# Patient Record
Sex: FEMALE | Race: WHITE | HISPANIC OR LATINO | Employment: FULL TIME | URBAN - METROPOLITAN AREA
[De-identification: names, ages, dates, MRNs, and addresses within clinical notes are randomized per-mention and may not be internally consistent; named-entity substitution may affect disease eponyms.]

---

## 2024-08-08 ENCOUNTER — APPOINTMENT (OUTPATIENT)
Dept: RADIOLOGY | Facility: HOSPITAL | Age: 39
End: 2024-08-08
Payer: COMMERCIAL

## 2024-08-08 ENCOUNTER — HOSPITAL ENCOUNTER (EMERGENCY)
Facility: HOSPITAL | Age: 39
Discharge: HOME | End: 2024-08-08
Attending: EMERGENCY MEDICINE
Payer: COMMERCIAL

## 2024-08-08 VITALS
WEIGHT: 215 LBS | HEIGHT: 69 IN | SYSTOLIC BLOOD PRESSURE: 139 MMHG | TEMPERATURE: 97.9 F | RESPIRATION RATE: 18 BRPM | DIASTOLIC BLOOD PRESSURE: 67 MMHG | HEART RATE: 98 BPM | BODY MASS INDEX: 31.84 KG/M2 | OXYGEN SATURATION: 98 %

## 2024-08-08 DIAGNOSIS — S41.121A: Primary | ICD-10-CM

## 2024-08-08 PROCEDURE — 90715 TDAP VACCINE 7 YRS/> IM: CPT

## 2024-08-08 PROCEDURE — 2500000004 HC RX 250 GENERAL PHARMACY W/ HCPCS (ALT 636 FOR OP/ED)

## 2024-08-08 PROCEDURE — 90471 IMMUNIZATION ADMIN: CPT

## 2024-08-08 PROCEDURE — 99283 EMERGENCY DEPT VISIT LOW MDM: CPT | Mod: 25

## 2024-08-08 PROCEDURE — 2500000005 HC RX 250 GENERAL PHARMACY W/O HCPCS

## 2024-08-08 PROCEDURE — 73090 X-RAY EXAM OF FOREARM: CPT | Mod: RIGHT SIDE | Performed by: RADIOLOGY

## 2024-08-08 PROCEDURE — 12002 RPR S/N/AX/GEN/TRNK2.6-7.5CM: CPT

## 2024-08-08 PROCEDURE — 73090 X-RAY EXAM OF FOREARM: CPT | Mod: RT

## 2024-08-08 PROCEDURE — 99284 EMERGENCY DEPT VISIT MOD MDM: CPT

## 2024-08-08 RX ORDER — LIDOCAINE HYDROCHLORIDE 10 MG/ML
10 INJECTION INFILTRATION; PERINEURAL ONCE
Status: DISCONTINUED | OUTPATIENT
Start: 2024-08-08 | End: 2024-08-08

## 2024-08-08 RX ORDER — CEPHALEXIN 500 MG/1
500 CAPSULE ORAL 4 TIMES DAILY
Qty: 28 CAPSULE | Refills: 0 | Status: SHIPPED | OUTPATIENT
Start: 2024-08-08 | End: 2024-08-15

## 2024-08-08 RX ORDER — LIDOCAINE HYDROCHLORIDE 10 MG/ML
10 INJECTION, SOLUTION EPIDURAL; INFILTRATION; INTRACAUDAL; PERINEURAL ONCE
Status: COMPLETED | OUTPATIENT
Start: 2024-08-08 | End: 2024-08-08

## 2024-08-08 RX ORDER — CEPHALEXIN 500 MG/1
500 CAPSULE ORAL 4 TIMES DAILY
Qty: 40 CAPSULE | Refills: 0 | Status: SHIPPED | OUTPATIENT
Start: 2024-08-08 | End: 2024-08-08

## 2024-08-08 RX ADMIN — LIDOCAINE HYDROCHLORIDE 100 MG: 10 INJECTION, SOLUTION EPIDURAL; INFILTRATION; INTRACAUDAL; PERINEURAL at 18:15

## 2024-08-08 RX ADMIN — TETANUS TOXOID, REDUCED DIPHTHERIA TOXOID AND ACELLULAR PERTUSSIS VACCINE, ADSORBED 0.5 ML: 5; 2.5; 8; 8; 2.5 SUSPENSION INTRAMUSCULAR at 16:54

## 2024-08-08 ASSESSMENT — PAIN - FUNCTIONAL ASSESSMENT: PAIN_FUNCTIONAL_ASSESSMENT: 0-10

## 2024-08-08 ASSESSMENT — LIFESTYLE VARIABLES
HAVE YOU EVER FELT YOU SHOULD CUT DOWN ON YOUR DRINKING: NO
EVER HAD A DRINK FIRST THING IN THE MORNING TO STEADY YOUR NERVES TO GET RID OF A HANGOVER: NO
EVER FELT BAD OR GUILTY ABOUT YOUR DRINKING: NO
TOTAL SCORE: 0
HAVE PEOPLE ANNOYED YOU BY CRITICIZING YOUR DRINKING: NO

## 2024-08-08 ASSESSMENT — PAIN SCALES - GENERAL: PAINLEVEL_OUTOF10: 7

## 2024-08-08 ASSESSMENT — COLUMBIA-SUICIDE SEVERITY RATING SCALE - C-SSRS
2. HAVE YOU ACTUALLY HAD ANY THOUGHTS OF KILLING YOURSELF?: NO
1. IN THE PAST MONTH, HAVE YOU WISHED YOU WERE DEAD OR WISHED YOU COULD GO TO SLEEP AND NOT WAKE UP?: NO
6. HAVE YOU EVER DONE ANYTHING, STARTED TO DO ANYTHING, OR PREPARED TO DO ANYTHING TO END YOUR LIFE?: NO

## 2024-08-08 ASSESSMENT — PAIN DESCRIPTION - PAIN TYPE: TYPE: ACUTE PAIN

## 2024-08-08 ASSESSMENT — PAIN DESCRIPTION - LOCATION: LOCATION: ARM

## 2024-08-08 ASSESSMENT — PAIN DESCRIPTION - ORIENTATION: ORIENTATION: RIGHT

## 2024-08-08 NOTE — DISCHARGE INSTRUCTIONS
Take antibiotics as prescribed.  Follow-up with referral.  Sutures will need to be removed in 10 to 14 days.

## 2024-08-08 NOTE — ED PROVIDER NOTES
Emergency Department Encounter  [facility]    Patient: James Ramos  MRN: [id number]  : 1985  Date of Evaluation: 2024  ED Provider: Gus Liu PA-C    ED care was supervised by Dr. Willett who independently examined and evaluated the patient. Please see their attestation note for further details.      Chief Complaint     Laceration    [place]    (Location/Symptom, Timing/Onset, Context/Setting, Quality, Duration, Modifying Factors, Severity) Note limiting factors.     James Ramos is a 39 y.o. female who presents to the emergency department for laceration to right forearm.  Patient said he was moving glass and his right forearm accidentally got cut.  Denies any possibility of foreign body.  Tetanus is not today.  Bleeding is controlled on arrival.  Injury occurred about 20 minutes ago.  Patient has full range of motion of right upper extremity.  No numbness or tingling in right hand.    Limitations to History: none  Records reviewed: EMR inpatient and outpatient notes, Care Everywhere    Past History   History reviewed. No pertinent past medical history.  History reviewed. No pertinent surgical history.  Social History     Socioeconomic History    Marital status: Single         Medications/Allergies     Previous Medications    No medications on file     No Known Allergies     Physical Exam       ED Triage Vitals [24 1629]   Temperature Heart Rate Respirations BP   36.6 °C (97.9 °F) (!) 105 18 133/87      Pulse Ox Temp Source Heart Rate Source Patient Position   96 % Temporal Monitor Sitting      BP Location FiO2 (%)     Left arm --       Physical Exam  GENERAL APPEARANCE: Awake and alert. Cooperative.   HEENT: Normocephalic. Atraumatic. Sclera anicteric. Tolerates saliva. No trismus.   NECK: Supple. Trachea midline.   CARDIO: Tachycardic initially.  LUNGS: Respirations unlabored. CTAB.  ABDOMEN: Soft. Non-distended. Non-tender throughout.  MUSCULOSKELETAL:  Right Upper Extremity Exam: Approximately 5 cm laceration over anterior aspect of right forearm. No foreign body appreciated.  No bleeding currently.  No tenderness to wound.  ROM is intact. 5/5 strength at shoulders, elbow flexion/extension, extension of wrist, palmar flexion of wrist, radial and ulnar deviation, and . Distal capillary refill takes less than 2 seconds. Radial pulses are 2+ bilaterally. Distal sensation and motor intact.  SKIN: Warm and dry.   NEUROLOGICAL: No gross facial drooping. No obvious neurologic deficits.  strength symmetrical. Moves all 4 extremities spontaneously.       Diagnostics   Labs:  Labs Reviewed - No data to display  Radiographs:  XR forearm right 2 views   Final Result   2.6 mm x 0.2 mm linear radiopaque foreign body within the deep soft   tissue seen on both AP and lateral views, and is annotated.        No osseous abnormality.             Signed by: Compa Crump 8/8/2024 5:40 PM   Dictation workstation:   [id number]            EMERGENCY DEPARTMENT COURSE and DIFFERENTIAL DIAGNOSIS/MDM/PLAN:   James Ramos is a 39 y.o. female who presented to the emergency department for laceration to right forearm. Differential diagnosis included laceration, wound, foreign body. Pt given tetanus booster. Our workup consisted of ordering/reviewing x-ray right forearm to rule out foreign body. Right forearm xray as interpreted by me and confirmed by radiologist showed 2.6 mm x 0.2 mm linear radiopaque foreign body within the deep soft tissue seen on both AP and lateral views, and is annotated. No osseous abnormality.    Patient presented with laceration to right forearm. Foreign body on x-ray.  No evidence of fracture.  Right arm is neurovascularly intact.  Wound inspected under direct bright light with good visualization. Area hemostatic. Neurovascular exam congruent with above. Area extensively irrigated with sterile normal saline under pressure.  Unable to remove or find  foreign body.  Laceration repaired in simple fashion as below (please see procedure note for further details). Patient tolerated procedure well and neurovascular exam intact and unchanged post repair with intact distal pulses and cap refill. Cautious return precautions discussed w/ full understanding. Given foreign body in wound, patient given prescription for Keflex.  Patient given orthopedic referral to follow-up with. Pt was told to follow up with primary care doctor within 48-72 hours for a wound check.  Patient given PCP referral as well.  Patient was told to return to ED or PCP for suture removal in 10-14 days. Pt told to return to the ED for any increased pain, redness, streaking (red lines), swelling, fever or chills. Vital signs on discharge are stable. James Ramos (or their surrogate) and I have discussed the diagnosis and risks, and we agree with discharging home with close follow-up.       Final Diagnosis:   1. Laceration with foreign body of right upper arm, initial encounter        Medications   lidocaine PF (Xylocaine) 10 mg/mL (1 %) injection 100 mg (has no administration in time range)   diphth,pertus(acell),tetanus (BoostRIX) 2.5-8-5 Lf-mcg-Lf/0.5mL vaccine 0.5 mL (0.5 mL intramuscular Given 8/8/24 1654)       Diagnoses as of 08/08/24 1843   Laceration with foreign body of right upper arm, initial encounter       CONSULTS:  None    PROCEDURES:  Unless otherwise noted below, none     Laceration Repair    Performed by: Gus Liu PA-C  Authorized by: Gus Liu PA-C    Consent:     Consent obtained:  Verbal    Consent given by:  Patient    Risks, benefits, and alternatives were discussed: yes      Risks discussed:  Infection, need for additional repair, poor wound healing, poor cosmetic result, pain, retained foreign body, tendon damage, vascular damage and nerve damage    Alternatives discussed:  No treatment and delayed treatment  Universal protocol:     Patient identity confirmed:   Verbally with patient and arm band  Anesthesia:     Anesthesia method:  Local infiltration    Local anesthetic:  Lidocaine 1% w/o epi  Laceration details:     Location:  Shoulder/arm    Shoulder/arm location:  R lower arm    Length (cm):  5    Depth (mm):  1  Pre-procedure details:     Preparation:  Imaging obtained to evaluate for foreign bodies  Exploration:     Hemostasis achieved with:  Direct pressure    Imaging obtained: x-ray      Imaging outcome: foreign body not noted      Wound exploration: wound explored through full range of motion      Wound extent: areolar tissue violated and foreign bodies/material    Treatment:     Area cleansed with:  Saline    Amount of cleaning:  Extensive    Irrigation solution:  Sterile saline    Irrigation method:  Pressure wash    Visualized foreign bodies/material removed: no    Skin repair:     Repair method:  Sutures    Suture size:  5-0    Suture material:  Nylon    Suture technique:  Simple interrupted    Number of sutures:  5  Approximation:     Approximation:  Close  Repair type:     Repair type:  Intermediate  Post-procedure details:     Dressing:  Open (no dressing)    Procedure completion:  Tolerated      DISPOSITION/PLAN  Discharge 08/08/2024 06:28:58 PM    PATIENT REFERRED TO:  No follow-up provider specified.    DISCHARGE MEDICATIONS:  New Prescriptions    CEPHALEXIN (KEFLEX) 500 MG CAPSULE    Take 1 capsule (500 mg) by mouth 4 times a day for 7 days.     @[facility]([id number]:LAST:1)@    (Please note:  Portions of this note were completed with a voice recognition program.  Efforts were made to edit the dictations but occasionally words and phrases are mis-transcribed.)  Form [id number]       Gus Liu PA-C  08/08/24 [id number]